# Patient Record
Sex: FEMALE | URBAN - METROPOLITAN AREA
[De-identification: names, ages, dates, MRNs, and addresses within clinical notes are randomized per-mention and may not be internally consistent; named-entity substitution may affect disease eponyms.]

---

## 2023-08-21 ENCOUNTER — OFFICE VISIT (OUTPATIENT)
Dept: FAMILY MEDICINE | Facility: CLINIC | Age: 53
End: 2023-08-21

## 2023-08-21 VITALS
SYSTOLIC BLOOD PRESSURE: 126 MMHG | BODY MASS INDEX: 35.68 KG/M2 | OXYGEN SATURATION: 96 % | HEART RATE: 99 BPM | RESPIRATION RATE: 18 BRPM | HEIGHT: 59 IN | TEMPERATURE: 99.3 F | WEIGHT: 177 LBS | DIASTOLIC BLOOD PRESSURE: 85 MMHG

## 2023-08-21 DIAGNOSIS — R30.0 DYSURIA: Primary | ICD-10-CM

## 2023-08-21 LAB
ALBUMIN UR-MCNC: 30 MG/DL
APPEARANCE UR: CLEAR
BILIRUB UR QL STRIP: NEGATIVE
COLOR UR AUTO: YELLOW
GLUCOSE UR STRIP-MCNC: NEGATIVE MG/DL
HGB UR QL STRIP: ABNORMAL
KETONES UR STRIP-MCNC: NEGATIVE MG/DL
LEUKOCYTE ESTERASE UR QL STRIP: ABNORMAL
MUCOUS THREADS #/AREA URNS LPF: PRESENT /LPF
NITRATE UR QL: NEGATIVE
PH UR STRIP: 6 [PH] (ref 5–7)
RBC URINE: 2 /HPF
SP GR UR STRIP: 1.03 (ref 1–1.03)
SQUAMOUS EPITHELIAL: 2 /HPF
TRANSITIONAL EPI: 1 /HPF
UROBILINOGEN UR STRIP-MCNC: 4 MG/DL
WBC URINE: 76 /HPF

## 2023-08-21 PROCEDURE — 81001 URINALYSIS AUTO W/SCOPE: CPT

## 2023-08-21 PROCEDURE — 87186 SC STD MICRODIL/AGAR DIL: CPT

## 2023-08-21 RX ORDER — ACETAMINOPHEN 325 MG/1
325 TABLET ORAL
COMMUNITY
Start: 2023-08-07

## 2023-08-21 RX ORDER — OMEGA-3 FATTY ACIDS/FISH OIL 300-1000MG
200 CAPSULE ORAL
COMMUNITY

## 2023-08-21 RX ORDER — NITROFURANTOIN 25; 75 MG/1; MG/1
100 CAPSULE ORAL 2 TIMES DAILY
Qty: 10 CAPSULE | Refills: 0 | Status: SHIPPED | OUTPATIENT
Start: 2023-08-21 | End: 2023-08-26

## 2023-08-21 ASSESSMENT — LIFESTYLE VARIABLES
HOW OFTEN DO YOU HAVE SIX OR MORE DRINKS ON ONE OCCASION: NEVER
HOW OFTEN DO YOU HAVE A DRINK CONTAINING ALCOHOL: NEVER

## 2023-08-21 ASSESSMENT — PAIN SCALES - GENERAL: PAINLEVEL: SEVERE PAIN (7)

## 2023-08-21 NOTE — PROGRESS NOTES
"[facility] Nursing Progress Note    Chief complaint: Dysuria and increased urinary frequency and urgency, accompanied by fatigue and symptoms of fever       Onset: Patient stated that the painful urination began about two weeks ago; she first felt feverish about one week ago  Location: Primary pain is localized to urinary tract; patient complains of some general body aches and fatigue as well  Duration, timing: Pain is intermittent, occurring upon urination  Aggravating: Pain occurs upon urination  Relieving Factors: Ibuprofen and acetaminophen have provided some relief from pain and fever symptoms  Treatments: Patient has taken some ibuprofen and acetaminophen at home  Severity: Pain fluctuates between 7 and 8 out of 10      Objective:  /85 (BP Location: Left arm, Patient Position: Sitting, Cuff Size: Adult Regular)   Pulse 99   Temp 99.3  F (37.4  C) (Temporal)   Resp 18   Ht 1.5 m (4' 11.06\")   Wt 80.3 kg (177 lb)   SpO2 96%   BMI 35.68 kg/m      Social History:    Occupation: Works at shop  Lifestyle (lvl of physical activity: Limited physical activity      PHQ Score:    PHQ2: 1      Nutrition Screen:    Q1: Never true    Q2: Sometimes true    Referral to nutrition needed?: No, but informed patient that nutrition team is onsite with resources including fresh produce      Nursing Clinician: Don Friedman  Nursing Preceptor: Kaylee hKan RN     "

## 2023-08-21 NOTE — PROGRESS NOTES
"MEDICINE NOTE    SUBJECTIVE:  Ms. Price is a 53 years old female with no significant past medical history who presents with dysuria that started 2 weeks ago.     Two weeks ago she began experiencing a sharp pain at her urethra with urination, along with subjective fevers (did not check temp), increased urinary urgency, and increased frequency. She managed her symptoms ibuprofen and tylenol and after a week they improved.     She had two days of very minimal symptoms and then all of the above returned, pain slightly less severe 8/10 in week 1 to 7/10 currently with urination.    She has no abdominal pain, back or flank pain, vaginal pain/irritation, changes in vaginal discharge, hematuria.     She is sexually exclusive with her . Last intercourse 3 weeks ago.    She has no history of UTIs that she can recall. She has had antibiotics in the past month for a sinus infection - she believes she took amoxicillin about 3 weeks ago.      REVIEW OF SYSTEMS:  General: positive for fever, chills, night sweats, fatigue  Skin: no rashes, lumps, or itchiness  Eyes: no vision change  Ears, Noses, Mouth, Throat: hearing felt off several weeks ago but feels better now  Cardiac: no chest pain  Lungs: no dyspnea  GI: no nausea, vomiting, or abdominal pain  : positive for dysuria, increase in urgency, increased frequency, no hematuria  Musculoskeletal: no joint or muscle pain  Neuro: no numbness or tingling    Social Determinants of Health     Health insurance: No health insurance       OBJECTIVE:  Physical Exam:  /85 (BP Location: Left arm, Patient Position: Sitting, Cuff Size: Adult Regular)   Pulse 99   Temp 99.3  F (37.4  C) (Temporal)   Resp 18   Ht 1.5 m (4' 11.06\")   Wt 80.3 kg (177 lb)   SpO2 96%   BMI 35.68 kg/m      Constitutional: no distress, comfortable, pleasant   : no CVA tenderness      ASSESSMENT/PLAN:    Dysuria  - Urinalysis ordered. Results pending. Will call with results.  - NitroFURantoin " macrocrystal-monohydrate (100mg): Take 2 capsules a day (1 in the morning and 1 at night). If symptoms do not resolve, please return to the clinic.    Differential includes acute cystitis (most likely), pyelonephritis (unlikely due to being afebrile, clinically well, no CVA tenderness), vulvovaginitis (unlikely due to absence of vaginal symptoms), urethritis (unlikely given sexually exclusive relationship), and nephrolithiasis (unlikely given absence of flank pain or pain when not urinating, absence of hematuria).    Likely diagnosis is UTI. Ms. Price will take Nitrofurantoin while lab results are pending. It is recommended that she returns to the clinic for physical exam and lab work if she fails to improve with the antibiotic. She was also provided resources to other free clinics ([facility] and [facility]) and was counseled on breast cancer screening and colon cancer screening.      Med Clinician: Sharron Phillip MS2  Preceptor: Dr. Sly Beth    In supervising the medical student, I repeated the exam documented above.  I have reviewed and verified the student s documentation.  Supervising Provider: Dr. Sly Beth

## 2023-08-21 NOTE — PROGRESS NOTES
"[facility] Pharmacy Progress Note    Chief complaint: Painful urination with fever     Last date of full med: 08/21/2023    Subjective  Min Price is a 53 year old female, a worker at a small shop, visiting [facility] for painful urgent urination and fever. She began to have the symptoms 2 weeks ago, then the pain stopped for 2 days, and the symptoms reoccurred for another week. Her most recent intercourse with her  was 3 weeks ago. According to the Pain Rating Scale, she describes the pain was 8/10 in the first week and now is 7/10 in the second week. The pain is sharp with no visible blood and only happens when she urinates. She does not experience any pain in the vagina and no discharge. She passed the Kidney Punch Test. The frequency of urination has increased from 3 times daily to 6 times daily since she had the symptoms. In addition, she reports that she has had fever 2 times within 2 weeks when she started to have the symptoms. She has been taking Tylenol 325 mg tablet once in the afternoon daily (around 3:00 pm) as needed for fever and Advil 200 mg capsule once in the morning (around 11:00 am) as needed for fever. She believes the medications work for her because she feels motivated to do things when taking medications. In addition, she reports that she took amoxicillin medication 3 weeks ago.     Min Price reports that she drinks a cup of coffee a day. She does not drink any alcohol and does not use any tobacco products. She reports she is not aware of any family history.     No current outpatient medications on file.       Objective:  /85 (BP Location: Left arm, Patient Position: Sitting, Cuff Size: Adult Regular)   Pulse 99   Temp 99.3  F (37.4  C) (Temporal)   Resp 18   Ht 1.5 m (4' 11.06\")   Wt 80.3 kg (177 lb)   SpO2 96%   BMI 35.68 kg/m        Assessment:   DTP: Needs Additional Therapy: untreated condition   Rationale: The patient is prescribed nitrofurantoin 100 mg capsule for UTI " (the urgent and painful urination). She will take 1 capsule by mouth 2 times (the morning and evening) daily for 5 days.       Plan:  Take 1 capsule by mouth 2 times (the morning and evening) daily for 5 days for UTI   Continue to take ibuprofen 200 mg capsule and acetaminophen 325 mg tablet once daily as needed for fever  Will be contacted for the urinalysis results  Recommend to do colonoscopy and breast cancer screening for preventative care    Follow-Up:  Initial follow-up the clinic via phone to discuss the lab results   Follow-up the clinic if the symptoms do not go away after 5 days       Pharmacy Clinician: Selena Kaye (Pharmacy Intern)  Pharmacy Preceptor: Sly Beth     _____________________________  Preceptor Use Only:  In supervising the student, I have reviewed and verified the student's documentation and found it to be correct and complete.   Preceptor Signature: Sly Beth MD

## 2023-08-22 NOTE — PATIENT INSTRUCTIONS
Resumen de la Visita    Nombre del Paciente: Min Price  MRN: [id number]    Date of Visit: 8/21/2023  Fecha de la visita: 21/08/2023    Principle Diagnosis:   Dysuria  Likely Urinary Tract Infection    Diagnosis Principal:   Disuria  Probable jermain infeccion del tracto urinario     Physician's Recommendations/Instructions:   Take antibiotics.   You've been prescribed Nitrofurantoin, which needs to be taken two times daily (once in morning and once in evening), for five days.   Please take all five days of medication even if you start to feel better.   It is possible that you will experience side effects for Nitrofurantoin, such as nausea or headache, but these are uncommon.     All of your pain with urination should be gone by the time you finish the antibiotics. If you are still experiencing pain after this period, please come back to the clinic for another visit.    Recommendacions/Instrucciones del doctor:   Amalia los antibiotics.   Le gregory prescribido nitrofuantonia, lo cual necesita carli 2 veces al angella (jermain por la manana y jermain por la noche), por 5 penn.   Por favor, tome le medicamentos para 5 penn aun empieza sentir mejor.   Es posible que experimente efectos secundarios para la nitrofuantonia, justo nausea o dolor de santino, janet no son comunes.     Cuando termine los antibiotics, todo el dolor debe ser eliviado. Si todavia experiemente dolor despues del los 5 penn, por favor regrese a la clinica para jermain visita nueva.     Lab Tests Performed:   Urinalysis    Exames del laboratorio:   Analisis de orina     Follow Up/Results:   We will call you tomorrow with the results of your urine test at the number provided.    Segumiento/Resultados:   Le llamaremos al thien que nos leander manana con los resultados de baugh examen de orina.     Referrals and Instructions:   Please feel free to come back to the clinic to get a general check up. We want to help you stay healthy.   Today you were given information about [facility]  and [facility]. These are two other free clinics that you could go to for continued health care without insurance.   At age 53 it is recommended that you get breast cancer screenings (mammograms) and colon cancer screenings (colonoscopy).    Referencias y instruciones:   Por favor venga a la clinica para jermain visita general. Le queremos ayudar a mantenerse saludable. Hoy le damos informacion sobre [facility] y [facility]. Estos son otras clinicas gratuitas a que puede ir para servicios de apolonia sin presentar seguro.   A la edad de 53, se recomiende que recibe examenes de cancer de mamas (mamogramas) y de cancer de colon (colonoscopia).     Medico:   Dr. Vasu Beth

## 2023-08-23 LAB — BACTERIA UR CULT: ABNORMAL

## 2023-08-28 ENCOUNTER — TELEPHONE (OUTPATIENT)
Dept: FAMILY MEDICINE | Facility: CLINIC | Age: 53
End: 2023-08-28

## 2023-08-29 NOTE — TELECONSULT
I called Ms. Price using [facility] ([phone]) ([place]) to follow-up on her recent [facility] visit for UTI concern. No one had called to update her on her lab results, so I conveyed these to her - positive for UTI, but the organism was susceptible to the Nitrofurantoin 100mg BID that we had already given her. She has completed her course of therapy as prescribed and reports having no side effects. Her pain and other symptoms have resolved and she reports no blood in her urine. She denies any further needs relating to this condition. I encouraged her to return to [facility] for any future health concerns and thanked her for her time.    Quintin Medley, PD3    Preceptor: Pao Crawford, [place]

## 2023-09-05 NOTE — TELEPHONE ENCOUNTER
In supervising the student, I have reviewed and verified the student's documentation and found it to be correct and complete.   Starr MooreD